# Patient Record
Sex: FEMALE | Race: WHITE | NOT HISPANIC OR LATINO | Employment: OTHER | URBAN - METROPOLITAN AREA
[De-identification: names, ages, dates, MRNs, and addresses within clinical notes are randomized per-mention and may not be internally consistent; named-entity substitution may affect disease eponyms.]

---

## 2017-03-14 ENCOUNTER — NURSE TRIAGE (OUTPATIENT)
Dept: ADMINISTRATIVE | Facility: CLINIC | Age: 77
End: 2017-03-14

## 2017-03-14 NOTE — TELEPHONE ENCOUNTER
Pt reports right hip pain after a fall on Friday night. Pt able to walk without difficutly. Pt denies severe pain. Pt states that motrin totally relieves her pain. Scheduled to see her MD Friday    Reason for Disposition   Injury and pain has not improved after 3 days    Protocols used: ST HIP INJURY-A-[place]

## 2017-03-15 DIAGNOSIS — M25.551 RIGHT HIP PAIN: Primary | ICD-10-CM

## 2017-03-17 ENCOUNTER — OFFICE VISIT (OUTPATIENT)
Dept: ORTHOPEDICS | Facility: CLINIC | Age: 77
End: 2017-03-17
Payer: MEDICARE

## 2017-03-17 ENCOUNTER — HOSPITAL ENCOUNTER (OUTPATIENT)
Dept: RADIOLOGY | Facility: HOSPITAL | Age: 77
Discharge: HOME | End: 2017-03-17
Attending: ORTHOPAEDIC SURGERY
Payer: MEDICARE

## 2017-03-17 VITALS — BODY MASS INDEX: 31.65 KG/M2 | WEIGHT: 172 LBS | HEIGHT: 62 IN

## 2017-03-17 DIAGNOSIS — M25.551 RIGHT HIP PAIN: Primary | ICD-10-CM

## 2017-03-17 DIAGNOSIS — F41.9 ANXIETY: ICD-10-CM

## 2017-03-17 DIAGNOSIS — M25.551 RIGHT HIP PAIN: ICD-10-CM

## 2017-03-17 PROCEDURE — 73502 X-RAY EXAM HIP UNI 2-3 VIEWS: CPT | Mod: 26,RT,, | Performed by: RADIOLOGY

## 2017-03-17 PROCEDURE — 1157F ADVNC CARE PLAN IN RCRD: CPT | Mod: S$GLB,,, | Performed by: ORTHOPAEDIC SURGERY

## 2017-03-17 PROCEDURE — 99213 OFFICE O/P EST LOW 20 MIN: CPT | Mod: S$GLB,,, | Performed by: ORTHOPAEDIC SURGERY

## 2017-03-17 PROCEDURE — 1160F RVW MEDS BY RX/DR IN RCRD: CPT | Mod: S$GLB,,, | Performed by: ORTHOPAEDIC SURGERY

## 2017-03-17 PROCEDURE — 1125F AMNT PAIN NOTED PAIN PRSNT: CPT | Mod: S$GLB,,, | Performed by: ORTHOPAEDIC SURGERY

## 2017-03-17 PROCEDURE — 1159F MED LIST DOCD IN RCRD: CPT | Mod: S$GLB,,, | Performed by: ORTHOPAEDIC SURGERY

## 2017-03-17 PROCEDURE — 99999 PR PBB SHADOW E&M-EST. PATIENT-LVL II: CPT | Mod: PBBFAC,,, | Performed by: ORTHOPAEDIC SURGERY

## 2017-03-20 ENCOUNTER — TELEPHONE (OUTPATIENT)
Dept: ORTHOPEDICS | Facility: CLINIC | Age: 77
End: 2017-03-20

## 2017-03-20 NOTE — TELEPHONE ENCOUNTER
----- Message from Genoveva Gleason sent at 3/20/2017 11:13 AM CDT -----  Contact: juliette   In pain, changed her mind, needs pain medication   .  [facility] [address] [place] [address] AT SEC of [facility] & [address] [address]  Phone: [phone] Fax: [phone]       Call back

## 2017-03-22 ENCOUNTER — TELEPHONE (OUTPATIENT)
Dept: ORTHOPEDICS | Facility: CLINIC | Age: 77
End: 2017-03-22

## 2017-03-22 NOTE — TELEPHONE ENCOUNTER
----- Message from Cyndie Grover sent at 3/22/2017 10:49 AM CDT -----  Contact: Self //   Calling for something stronger for the pain.  [facility] in [place].  Please call

## 2017-04-19 DIAGNOSIS — M25.512 BILATERAL SHOULDER PAIN, UNSPECIFIED CHRONICITY: Primary | ICD-10-CM

## 2017-04-19 DIAGNOSIS — M25.511 BILATERAL SHOULDER PAIN, UNSPECIFIED CHRONICITY: Primary | ICD-10-CM

## 2017-04-20 ENCOUNTER — HOSPITAL ENCOUNTER (OUTPATIENT)
Dept: RADIOLOGY | Facility: HOSPITAL | Age: 77
Discharge: HOME | End: 2017-04-20
Attending: ORTHOPAEDIC SURGERY
Payer: MEDICARE

## 2017-04-20 ENCOUNTER — OFFICE VISIT (OUTPATIENT)
Dept: ORTHOPEDICS | Facility: CLINIC | Age: 77
End: 2017-04-20
Payer: MEDICARE

## 2017-04-20 VITALS — HEIGHT: 62 IN | BODY MASS INDEX: 31.65 KG/M2 | WEIGHT: 172 LBS

## 2017-04-20 DIAGNOSIS — M25.512 BILATERAL SHOULDER PAIN, UNSPECIFIED CHRONICITY: ICD-10-CM

## 2017-04-20 DIAGNOSIS — M25.511 BILATERAL SHOULDER PAIN, UNSPECIFIED CHRONICITY: ICD-10-CM

## 2017-04-20 DIAGNOSIS — M25.551 RIGHT HIP PAIN: Primary | ICD-10-CM

## 2017-04-20 PROCEDURE — 1160F RVW MEDS BY RX/DR IN RCRD: CPT | Mod: S$GLB,,, | Performed by: ORTHOPAEDIC SURGERY

## 2017-04-20 PROCEDURE — 1159F MED LIST DOCD IN RCRD: CPT | Mod: S$GLB,,, | Performed by: ORTHOPAEDIC SURGERY

## 2017-04-20 PROCEDURE — 99213 OFFICE O/P EST LOW 20 MIN: CPT | Mod: S$GLB,,, | Performed by: ORTHOPAEDIC SURGERY

## 2017-04-20 PROCEDURE — 99999 PR PBB SHADOW E&M-EST. PATIENT-LVL II: CPT | Mod: PBBFAC,,, | Performed by: ORTHOPAEDIC SURGERY

## 2017-04-20 PROCEDURE — 73030 X-RAY EXAM OF SHOULDER: CPT | Mod: 26,RT,, | Performed by: RADIOLOGY

## 2017-04-20 NOTE — PROGRESS NOTES
Karolyn Avila, 76 years old, here to have her shoulder and hip checked.  Again,   she had a fall over a month ago.    PHYSICAL EXAM:  Exam today shows she is able to put her arm up over her head   without much difficulty.  No instability.  Cuff strength is weak and painful.    Skin is intact.  Compartments are soft.    X-rays of the shoulder showed degenerative type changes.    ASSESSMENT:  Degenerative disease of the shoulder.    PLAN:  Symptomatic care, reassurance, strengthening exercises.  Follow up as   needed.          REAL/GUERA  dd: 04/20/2017 13:36:04 (CDT)  td: 04/20/2017 15:57:15 (CDT)  Doc ID   #[id number]  Job ID #[id number]    CC:

## 2018-05-08 ENCOUNTER — OFFICE VISIT (OUTPATIENT)
Dept: OTOLARYNGOLOGY | Facility: CLINIC | Age: 78
End: 2018-05-08
Payer: MEDICARE

## 2018-05-08 VITALS — HEIGHT: 62 IN | WEIGHT: 164.63 LBS | BODY MASS INDEX: 30.29 KG/M2 | TEMPERATURE: 99 F

## 2018-05-08 DIAGNOSIS — Z79.899 POLYPHARMACY: ICD-10-CM

## 2018-05-08 DIAGNOSIS — R42 DIZZINESS AND GIDDINESS: ICD-10-CM

## 2018-05-08 DIAGNOSIS — J31.0 CHRONIC RHINITIS: ICD-10-CM

## 2018-05-08 PROCEDURE — 99204 OFFICE O/P NEW MOD 45 MIN: CPT | Mod: S$GLB,,, | Performed by: OTOLARYNGOLOGY

## 2018-05-08 PROCEDURE — 99999 PR PBB SHADOW E&M-EST. PATIENT-LVL III: CPT | Mod: PBBFAC,,, | Performed by: OTOLARYNGOLOGY

## 2018-05-08 NOTE — PROGRESS NOTES
Subjective:       Patient ID: Karolyn Avila is a 77 y.o. female.    Chief Complaint: Sinus Problem; Dizziness; and Ear Fullness    Karolyn is here for nasal congestion and dizziness. Congestion = Symptoms have been present for many years, worse in the am and at night. She uses Flonase 1 spray daily, not every day - this helps.. She takes an oral AH every day. She denies previous sinus surgery.     She also has chronic dizziness for 60 years. She had vertigo when she rolls to the right side which is brief, as well as vertigo when she leans over. This has been going on for a long time and she has modified her positioning to avoid the vertigo. She has been treated for BPPV many years ago. She had severe vertigo many years ago which sounds like a like a vestibular neuritis / labyrinthitis. Family history of Meniere's.   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 She has taken Meclizine and BZD chronically for many years. She has been falling more recently and she uses a walker.      She reports significant mouth dryness. She is on many medications.      History   Smoking Status    Current Every Day Smoker    Packs/day: 0.25   Smokeless Tobacco    Never Used     Comment: 4 cigarettes daily     History   Alcohol Use No          Review of Systems   Constitutional: Negative for activity change and appetite change.   Eyes: Negative for discharge.   Respiratory: Negative for difficulty breathing and wheezing   Cardiovascular: Negative for chest pain.   Gastrointestinal: Negative for abdominal distention and abdominal pain.   Endocrine: Negative for cold intolerance and heat intolerance.   Genitourinary: Negative for dysuria.   Musculoskeletal: Negative for gait problem and joint swelling.   Skin: Negative for color change and pallor.   Neurological: Negative for syncope and weakness.   Psychiatric/Behavioral: Negative for agitation and confusion.     Objective:        Constitutional:   She is oriented to  person, place, and time. She appears well-developed and well-nourished. She appears alert. She is active. Normal speech.      Head:  Normocephalic and atraumatic. Head is without TMJ tenderness. No scars. Salivary glands normal.  Facial strength is normal.      Ears:    Right Ear: No drainage or swelling. No middle ear effusion.   Left Ear: No drainage or swelling.  No middle ear effusion.   Refuses Iza Hallpike    Nose:  No mucosal edema, rhinorrhea, sinus tenderness or septal deviation. No turbinate hypertrophy.      Mouth/Throat  Normal uvula midline and mirror exam normal. She has dentures. Xerostomia (moderate/severe) present. No uvula swelling, lacerations or trismus. No oropharyngeal exudate. Tonsillar erythema, tonsillar exudate.      Neck:  Full range of motion with neck supple and no adenopathy. Thyroid tenderness is present. No tracheal deviation, no edema, no erythema, normal range of motion, no stridor, no crepitus and no neck rigidity present. No thyroid mass present.     Cardiovascular:   Intact distal pulses and normal pulses.      Pulmonary/Chest:   Effort normal and breath sounds normal. No stridor.     Psychiatric:   Her speech is normal and behavior is normal. Her mood appears not anxious. Her affect is not labile.     Neurological:   She is alert and oriented to person, place, and time. No sensory deficit.     Skin:   No abrasions, lacerations, lesions, or rashes. No abrasion and no bruising noted.         Tests / Results:  None    Assessment:       1. Dizziness and giddiness    2. Polypharmacy    3. Chronic rhinitis          Plan:       Regarding Congestion: recommend consistent use of Flonase and increase to 2 spray EN daily. Decrease oral anti-histamine use and see if this can improve dryness in mouth. Also recommend weaning Meclizine and BZD, although she has been on it so long, it may be difficulty.     Discussed that the above medications put her at increased risk for falling. Her  daughters will try to work on decreasing her meds as able. They are aware of w/d side effects with BZD    Suspect BPPV, she doesn't want to test for it. She is very worried about stimulating her vertigo. I told her that I could fix her if this was the case, but she will think about it.    Follow-up 3 months

## 2018-05-08 NOTE — PATIENT INSTRUCTIONS
Go up to 1 spray each nostril twice daily or 2 sprays each nostril once daily - for Flonase    Use Claritin as needed for allergy symptoms (itchy eyes, watery eyes) - this can dry you out    Klonopin and Meclizine are fall risks    Call me back if you'd like to be tested for the inner ear crystals, BPPV

## 2018-05-09 PROBLEM — J31.0 CHRONIC RHINITIS: Status: ACTIVE | Noted: 2018-05-09

## 2018-05-09 PROBLEM — Z79.899 POLYPHARMACY: Status: ACTIVE | Noted: 2018-05-09

## 2018-05-09 PROBLEM — R42 DIZZINESS AND GIDDINESS: Status: ACTIVE | Noted: 2018-05-09

## 2018-10-09 DIAGNOSIS — R29.898 WEAKNESS OF BOTH LEGS: Primary | ICD-10-CM

## 2018-10-09 DIAGNOSIS — M17.0 PRIMARY OSTEOARTHRITIS OF BOTH KNEES: ICD-10-CM

## 2019-01-03 DIAGNOSIS — M25.512 LEFT SHOULDER PAIN, UNSPECIFIED CHRONICITY: Primary | ICD-10-CM

## 2019-01-03 DIAGNOSIS — M25.512 ACUTE PAIN OF LEFT SHOULDER: ICD-10-CM

## 2019-01-07 ENCOUNTER — HOSPITAL ENCOUNTER (OUTPATIENT)
Dept: RADIOLOGY | Facility: HOSPITAL | Age: 79
Discharge: HOME | End: 2019-01-07
Attending: ORTHOPAEDIC SURGERY
Payer: MEDICARE

## 2019-01-07 ENCOUNTER — OFFICE VISIT (OUTPATIENT)
Dept: ORTHOPEDICS | Facility: CLINIC | Age: 79
End: 2019-01-07
Payer: MEDICARE

## 2019-01-07 VITALS — HEIGHT: 60 IN | WEIGHT: 166 LBS | BODY MASS INDEX: 32.59 KG/M2

## 2019-01-07 DIAGNOSIS — M25.512 ACUTE PAIN OF LEFT SHOULDER: ICD-10-CM

## 2019-01-07 DIAGNOSIS — M25.512 LEFT SHOULDER PAIN, UNSPECIFIED CHRONICITY: Primary | ICD-10-CM

## 2019-01-07 DIAGNOSIS — M19.012 OSTEOARTHRITIS OF LEFT SHOULDER, UNSPECIFIED OSTEOARTHRITIS TYPE: ICD-10-CM

## 2019-01-07 DIAGNOSIS — S49.92XA INJURY OF LEFT SHOULDER, INITIAL ENCOUNTER: ICD-10-CM

## 2019-01-07 PROCEDURE — 20610 LARGE JOINT ASPIRATION/INJECTION: L SUBACROMIAL BURSA: ICD-10-PCS | Mod: LT,S$GLB,, | Performed by: ORTHOPAEDIC SURGERY

## 2019-01-07 PROCEDURE — 1101F PT FALLS ASSESS-DOCD LE1/YR: CPT | Mod: CPTII,S$GLB,, | Performed by: ORTHOPAEDIC SURGERY

## 2019-01-07 PROCEDURE — 73030 X-RAY EXAM OF SHOULDER: CPT | Mod: TC,PO,LT

## 2019-01-07 PROCEDURE — 99999 PR PBB SHADOW E&M-EST. PATIENT-LVL II: CPT | Mod: PBBFAC,,, | Performed by: ORTHOPAEDIC SURGERY

## 2019-01-07 PROCEDURE — 20610 DRAIN/INJ JOINT/BURSA W/O US: CPT | Mod: LT,S$GLB,, | Performed by: ORTHOPAEDIC SURGERY

## 2019-01-07 PROCEDURE — 99999 PR PBB SHADOW E&M-EST. PATIENT-LVL II: ICD-10-PCS | Mod: PBBFAC,,, | Performed by: ORTHOPAEDIC SURGERY

## 2019-01-07 PROCEDURE — 1101F PR PT FALLS ASSESS DOC 0-1 FALLS W/OUT INJ PAST YR: ICD-10-PCS | Mod: CPTII,S$GLB,, | Performed by: ORTHOPAEDIC SURGERY

## 2019-01-07 PROCEDURE — 73030 X-RAY EXAM OF SHOULDER: CPT | Mod: 26,LT,, | Performed by: RADIOLOGY

## 2019-01-07 PROCEDURE — 73030 XR SHOULDER TRAUMA 3 VIEW LEFT: ICD-10-PCS | Mod: 26,LT,, | Performed by: RADIOLOGY

## 2019-01-07 PROCEDURE — 99214 OFFICE O/P EST MOD 30 MIN: CPT | Mod: 25,S$GLB,, | Performed by: ORTHOPAEDIC SURGERY

## 2019-01-07 PROCEDURE — 99214 PR OFFICE/OUTPT VISIT, EST, LEVL IV, 30-39 MIN: ICD-10-PCS | Mod: 25,S$GLB,, | Performed by: ORTHOPAEDIC SURGERY

## 2019-01-07 RX ORDER — TRIAMCINOLONE ACETONIDE 40 MG/ML
80 INJECTION, SUSPENSION INTRA-ARTICULAR; INTRAMUSCULAR
Status: DISCONTINUED | OUTPATIENT
Start: 2019-01-07 | End: 2019-01-07 | Stop reason: HOSPADM

## 2019-01-07 RX ADMIN — TRIAMCINOLONE ACETONIDE 80 MG: 40 INJECTION, SUSPENSION INTRA-ARTICULAR; INTRAMUSCULAR at 02:01

## 2019-01-07 NOTE — PROCEDURES
Large Joint Aspiration/Injection: L subacromial bursa  Date/Time: 1/7/2019 2:18 PM  Performed by: Carlos Burgos MD  Authorized by: Carlos Burgos MD     Consent Done?:  Yes (Verbal)  Indications:  Pain  Procedure site marked: Yes    Timeout: Prior to procedure the correct patient, procedure, and site was verified      Location:  Shoulder  Site:  L subacromial bursa  Prep: Patient was prepped and draped in usual sterile fashion    Ultrasonic Guidance for needle placement: No  Needle size:  21 G  Approach:  Posterior  Medications:  80 mg triamcinolone acetonide 40 mg/mL  Patient tolerance:  Patient tolerated the procedure well with no immediate complications

## 2019-01-08 ENCOUNTER — TELEPHONE (OUTPATIENT)
Dept: ORTHOPEDICS | Facility: CLINIC | Age: 79
End: 2019-01-08

## 2019-01-08 NOTE — TELEPHONE ENCOUNTER
Patients daughter called and stated that home health is approved. Home health is notified. ----- Message from Sammie Garay sent at 1/8/2019  1:17 PM CST -----  Contact: pt daughter  Pt called to speak with nurse.            Pt callback number [phone]

## 2019-01-08 NOTE — TELEPHONE ENCOUNTER
Talked with daughter and she is going to call the insurance company and get her set up for home health so patient can be seen quicker. Daughter stated that patient is currently not under home health care. ----- Message from Alicia Oconnor sent at 1/8/2019  8:23 AM CST -----  Contact: Carla HAMPTON  Carla states she received the referral for the patients home health and ran the insurance and it is showing pending.  No one will be going out until she can get the insurance approved and she just wanted to let you know.  As soon as she gets it fixed she will let you know.  Call back if any questions at  [phone].  Thanks

## 2019-05-01 ENCOUNTER — TELEPHONE (OUTPATIENT)
Dept: AUDIOLOGY | Facility: CLINIC | Age: 79
End: 2019-05-01

## 2019-05-01 NOTE — TELEPHONE ENCOUNTER
Called [facility] back to let them know that pt was seen by Dr. Davide Agosto on 5/8/18 for dizziness and declined testing at that time due to concerns that it would trigger her vertigo.  Explained that the same testing is needed to see if she has BPPV as this is what was suspected last time and that it would likely trigger some vertigo.  They will check with patient to see if she is willing to undergo testing and call back to schedule if so.

## 2019-05-01 NOTE — TELEPHONE ENCOUNTER
----- Message from Jailene Borges sent at 4/25/2019 11:19 AM CDT -----  Patient is dizzy and wants to schedule. Patient is at [facility]. Number for [facility]: [phone].

## 2019-05-22 NOTE — TELEPHONE ENCOUNTER
Appt was scheduled by [facility] and later cancelled when pt said that she did not want to come for testing.

## 2021-05-10 ENCOUNTER — PATIENT MESSAGE (OUTPATIENT)
Dept: RESEARCH | Facility: HOSPITAL | Age: 81
End: 2021-05-10

## 2023-05-09 ENCOUNTER — HOSPITAL ENCOUNTER (OUTPATIENT)
Dept: RADIOLOGY | Facility: HOSPITAL | Age: 83
Discharge: HOME | End: 2023-05-09
Attending: PHYSICIAN ASSISTANT
Payer: MEDICARE

## 2023-05-09 ENCOUNTER — OFFICE VISIT (OUTPATIENT)
Dept: ORTHOPEDICS | Facility: CLINIC | Age: 83
End: 2023-05-09
Payer: MEDICARE

## 2023-05-09 DIAGNOSIS — M79.642 LEFT HAND PAIN: ICD-10-CM

## 2023-05-09 DIAGNOSIS — M79.642 LEFT HAND PAIN: Primary | ICD-10-CM

## 2023-05-09 DIAGNOSIS — S69.92XA INJURY OF LEFT HAND, INITIAL ENCOUNTER: Primary | ICD-10-CM

## 2023-05-09 PROCEDURE — 1159F MED LIST DOCD IN RCRD: CPT | Mod: CPTII,S$GLB,, | Performed by: PHYSICIAN ASSISTANT

## 2023-05-09 PROCEDURE — 3288F FALL RISK ASSESSMENT DOCD: CPT | Mod: CPTII,S$GLB,, | Performed by: PHYSICIAN ASSISTANT

## 2023-05-09 PROCEDURE — 1125F AMNT PAIN NOTED PAIN PRSNT: CPT | Mod: CPTII,S$GLB,, | Performed by: PHYSICIAN ASSISTANT

## 2023-05-09 PROCEDURE — 73130 X-RAY EXAM OF HAND: CPT | Mod: 26,LT,, | Performed by: RADIOLOGY

## 2023-05-09 PROCEDURE — 1159F PR MEDICATION LIST DOCUMENTED IN MEDICAL RECORD: ICD-10-PCS | Mod: CPTII,S$GLB,, | Performed by: PHYSICIAN ASSISTANT

## 2023-05-09 PROCEDURE — 99203 OFFICE O/P NEW LOW 30 MIN: CPT | Mod: S$GLB,,, | Performed by: PHYSICIAN ASSISTANT

## 2023-05-09 PROCEDURE — 73130 X-RAY EXAM OF HAND: CPT | Mod: TC,PO,LT

## 2023-05-09 PROCEDURE — 1100F PTFALLS ASSESS-DOCD GE2>/YR: CPT | Mod: CPTII,S$GLB,, | Performed by: PHYSICIAN ASSISTANT

## 2023-05-09 PROCEDURE — 1160F RVW MEDS BY RX/DR IN RCRD: CPT | Mod: CPTII,S$GLB,, | Performed by: PHYSICIAN ASSISTANT

## 2023-05-09 PROCEDURE — 99999 PR PBB SHADOW E&M-EST. PATIENT-LVL III: ICD-10-PCS | Mod: PBBFAC,,, | Performed by: PHYSICIAN ASSISTANT

## 2023-05-09 PROCEDURE — 1160F PR REVIEW ALL MEDS BY PRESCRIBER/CLIN PHARMACIST DOCUMENTED: ICD-10-PCS | Mod: CPTII,S$GLB,, | Performed by: PHYSICIAN ASSISTANT

## 2023-05-09 PROCEDURE — 99203 PR OFFICE/OUTPT VISIT, NEW, LEVL III, 30-44 MIN: ICD-10-PCS | Mod: S$GLB,,, | Performed by: PHYSICIAN ASSISTANT

## 2023-05-09 PROCEDURE — 3288F PR FALLS RISK ASSESSMENT DOCUMENTED: ICD-10-PCS | Mod: CPTII,S$GLB,, | Performed by: PHYSICIAN ASSISTANT

## 2023-05-09 PROCEDURE — 99999 PR PBB SHADOW E&M-EST. PATIENT-LVL III: CPT | Mod: PBBFAC,,, | Performed by: PHYSICIAN ASSISTANT

## 2023-05-09 PROCEDURE — 1125F PR PAIN SEVERITY QUANTIFIED, PAIN PRESENT: ICD-10-PCS | Mod: CPTII,S$GLB,, | Performed by: PHYSICIAN ASSISTANT

## 2023-05-09 PROCEDURE — 73130 XR HAND COMPLETE 3 VIEW LEFT: ICD-10-PCS | Mod: 26,LT,, | Performed by: RADIOLOGY

## 2023-05-09 PROCEDURE — 1100F PR PT FALLS ASSESS DOC 2+ FALLS/FALL W/INJURY/YR: ICD-10-PCS | Mod: CPTII,S$GLB,, | Performed by: PHYSICIAN ASSISTANT

## 2023-05-09 NOTE — PROGRESS NOTES
5/9/2023    Chief Complaint:  Chief Complaint   Patient presents with    Left Hand - Injury, Pain     Thumb - Fall - 1 month       HPI:  Karolyn Avila is a 82 y.o. female, who presents to clinic today with her daughter for evaluation of her left hand/thumb injury.  States approximally 1.5 months ago she fell trying to get into her bed and brace herself with her left hand/thumb.  States since that time she is noticed some pain to the left hand/thumb.  States pain is worse with activity.  States pain is better with rest.  States pain on average is 5/10.  Denies any other complaints at this time.    PMHX:  Past Medical History:   Diagnosis Date    Anxiety     Depression     Female stress incontinence     GERD (gastroesophageal reflux disease)     Hypertension     Irritable bowel syndrome     Smoker        PSHX:  Past Surgical History:   Procedure Laterality Date    APPENDECTOMY      HYSTERECTOMY  1974    fibroids    JOINT REPLACEMENT      TONSILLECTOMY      TOTAL KNEE ARTHROPLASTY      right 04/23/2008, left 04/15/2013       FMHX:  Family History   Problem Relation Age of Onset    Diabetes Father     Diabetes Sister     Diabetes Maternal Uncle     Alzheimer's disease Mother     Hypertension Mother        SOCHX:  Social History     Tobacco Use    Smoking status: Every Day     Packs/day: 0.25     Types: Cigarettes    Smokeless tobacco: Never    Tobacco comments:     4 cigarettes daily   Substance Use Topics    Alcohol use: No       ALLERGIES:  Carisoprodol, Haloperidol decanoate, and Morphine    CURRENT MEDICATIONS:  Current Outpatient Medications on File Prior to Visit   Medication Sig Dispense Refill    aspirin (ECOTRIN) 81 MG EC tablet Take 81 mg by mouth once daily.      clonazePAM (KLONOPIN) 0.5 MG tablet TAKE 1 TABLET BY MOUTH TWICE DAILY 60 tablet 0    DULoxetine (CYMBALTA) 60 MG capsule TAKE 2 CAPSULES BY MOUTH DAILY 180 capsule 0    esomeprazole magnesium 20 mg TbEC Take 1 tablet by mouth daily as needed.       ibuprofen (ADVIL,MOTRIN) 200 MG tablet Take 200 mg by mouth daily as needed for Pain.       lisinopril (PRINIVIL,ZESTRIL) 20 MG tablet TAKE 1 TABLET BY MOUTH TWICE DAILY 180 tablet 3    meclizine (ANTIVERT) 25 mg tablet Take 25 mg by mouth 3 (three) times daily as needed.      meclizine (ANTIVERT) 25 mg tablet TAKE ONE TABLET BY MOUTH THREE TIMES DAILY AS NEEDED FOR DIZZINESS OR NAUSEA 30 tablet 0    metoprolol tartrate (LOPRESSOR) 100 MG tablet TAKE 1 TABLET BY MOUTH TWICE DAILY 180 tablet 0    multivitamin capsule Take 1 capsule by mouth once daily.      amLODIPine (NORVASC) 5 MG tablet Take 1 tablet (5 mg total) by mouth once daily. 30 tablet 11     No current facility-administered medications on file prior to visit.       REVIEW OF SYSTEMS:  Review of Systems   Constitutional: Negative.    HENT:  Positive for hearing loss.    Eyes: Negative.    Respiratory: Negative.     Cardiovascular: Negative.    Gastrointestinal: Negative.    Genitourinary: Negative.    Musculoskeletal:  Positive for falls and joint pain.   Skin: Negative.    Neurological:  Positive for weakness.   Endo/Heme/Allergies:  Bruises/bleeds easily.   Psychiatric/Behavioral: Negative.       GENERAL PHYSICAL EXAM:   There were no vitals taken for this visit.   GEN: well developed, well nourished, no acute distress   HENT: Normocephalic, atraumatic   EYES: No discharge, conjunctiva normal   NECK: Supple, non-tender   PULM: No wheezing, no respiratory distress   CV: RRR   ABD: Soft, non-tender    ORTHO EXAM:   Examination of the left hand/wrist reveals no edema, erythema, ecchymosis, or skin breakdown.  Able make composite fist and fully extend all fingers.  Full intact range of motion left wrist.  Tenderness to palpation of the 1st extensor compartment.  No significant tenderness with palpation throughout the remainder of the left hand/wrist.  Mildly positive Finkelstein's test.  Normal sensation in the radial, ulnar, median nerve distributions.   Capillary refill less than 2 seconds in all fingers.    RADIOLOGY:   X-rays of the left hand were taken today in clinic.  X-rays reviewed by myself.  Imaging showed no acute fracture or dislocation.  No subluxation.  Presence of scattered degenerative changes throughout the joints of the right hand.  Presence of mild degenerative changes of the radiocarpal joint.  Presence of calcifications over the dorsum of the radiocarpal joint.  No osseous destructive/erosive processes noted.  No radiopaque foreign body or mass noted.  No other significant bony abnormalities noted.    ASSESSMENT:   Left wrist injury with questionable associated de Quervain tenosynovitis    PLAN:  1. I discussed with Karolyn Avila and her daughter the left hand/wrist injury pathology and treatment options in detail during today's visit.  After treatment options were discussed, decided the best course of action this time is to proceed with immobilization via a removable Velcro thumb spica splint.  We discussed importance of wearing the splint at all times only to remove for bathing and gentle range-of-motion exercises demonstrated in clinic.  They verbally agreed with the treatment plan.      2.  She was placed in a removable Velcro thumb spica splint of the left thumb in clinic today.      3. I would like her follow up in clinic in 3 weeks for repeat evaluation.  She was instructed to contact the clinic for any problems or concerns in the interim.

## 2023-05-30 ENCOUNTER — OFFICE VISIT (OUTPATIENT)
Dept: ORTHOPEDICS | Facility: CLINIC | Age: 83
End: 2023-05-30
Payer: MEDICARE

## 2023-05-30 VITALS — HEIGHT: 60 IN | WEIGHT: 158 LBS | BODY MASS INDEX: 31.02 KG/M2

## 2023-05-30 DIAGNOSIS — M65.4 DE QUERVAIN'S TENOSYNOVITIS, LEFT: Primary | ICD-10-CM

## 2023-05-30 PROCEDURE — 99999 PR PBB SHADOW E&M-EST. PATIENT-LVL III: ICD-10-PCS | Mod: PBBFAC,,, | Performed by: PHYSICIAN ASSISTANT

## 2023-05-30 PROCEDURE — 99213 PR OFFICE/OUTPT VISIT, EST, LEVL III, 20-29 MIN: ICD-10-PCS | Mod: 25,S$GLB,, | Performed by: PHYSICIAN ASSISTANT

## 2023-05-30 PROCEDURE — 99999 PR PBB SHADOW E&M-EST. PATIENT-LVL III: CPT | Mod: PBBFAC,,, | Performed by: PHYSICIAN ASSISTANT

## 2023-05-30 PROCEDURE — 1101F PR PT FALLS ASSESS DOC 0-1 FALLS W/OUT INJ PAST YR: ICD-10-PCS | Mod: CPTII,S$GLB,, | Performed by: PHYSICIAN ASSISTANT

## 2023-05-30 PROCEDURE — 20550 NJX 1 TENDON SHEATH/LIGAMENT: CPT | Mod: LT,S$GLB,, | Performed by: PHYSICIAN ASSISTANT

## 2023-05-30 PROCEDURE — 1101F PT FALLS ASSESS-DOCD LE1/YR: CPT | Mod: CPTII,S$GLB,, | Performed by: PHYSICIAN ASSISTANT

## 2023-05-30 PROCEDURE — 1160F PR REVIEW ALL MEDS BY PRESCRIBER/CLIN PHARMACIST DOCUMENTED: ICD-10-PCS | Mod: CPTII,S$GLB,, | Performed by: PHYSICIAN ASSISTANT

## 2023-05-30 PROCEDURE — 99213 OFFICE O/P EST LOW 20 MIN: CPT | Mod: 25,S$GLB,, | Performed by: PHYSICIAN ASSISTANT

## 2023-05-30 PROCEDURE — 1160F RVW MEDS BY RX/DR IN RCRD: CPT | Mod: CPTII,S$GLB,, | Performed by: PHYSICIAN ASSISTANT

## 2023-05-30 PROCEDURE — 1125F PR PAIN SEVERITY QUANTIFIED, PAIN PRESENT: ICD-10-PCS | Mod: CPTII,S$GLB,, | Performed by: PHYSICIAN ASSISTANT

## 2023-05-30 PROCEDURE — 1159F PR MEDICATION LIST DOCUMENTED IN MEDICAL RECORD: ICD-10-PCS | Mod: CPTII,S$GLB,, | Performed by: PHYSICIAN ASSISTANT

## 2023-05-30 PROCEDURE — 3288F PR FALLS RISK ASSESSMENT DOCUMENTED: ICD-10-PCS | Mod: CPTII,S$GLB,, | Performed by: PHYSICIAN ASSISTANT

## 2023-05-30 PROCEDURE — 1125F AMNT PAIN NOTED PAIN PRSNT: CPT | Mod: CPTII,S$GLB,, | Performed by: PHYSICIAN ASSISTANT

## 2023-05-30 PROCEDURE — 1159F MED LIST DOCD IN RCRD: CPT | Mod: CPTII,S$GLB,, | Performed by: PHYSICIAN ASSISTANT

## 2023-05-30 PROCEDURE — 20550 TENDON SHEATH: ICD-10-PCS | Mod: LT,S$GLB,, | Performed by: PHYSICIAN ASSISTANT

## 2023-05-30 PROCEDURE — 3288F FALL RISK ASSESSMENT DOCD: CPT | Mod: CPTII,S$GLB,, | Performed by: PHYSICIAN ASSISTANT

## 2023-05-30 RX ORDER — TRIAMCINOLONE ACETONIDE 40 MG/ML
20 INJECTION, SUSPENSION INTRA-ARTICULAR; INTRAMUSCULAR
Status: DISCONTINUED | OUTPATIENT
Start: 2023-05-30 | End: 2023-05-30 | Stop reason: HOSPADM

## 2023-05-30 RX ADMIN — TRIAMCINOLONE ACETONIDE 20 MG: 40 INJECTION, SUSPENSION INTRA-ARTICULAR; INTRAMUSCULAR at 02:05

## 2023-05-30 NOTE — PROGRESS NOTES
5/30/2023    HPI:  Karolyn Avila is a 82 y.o. female, who presents to clinic today for continued evaluation of her left thumb/wrist injury and pain.  She is approximately 2 months status post injury.  States no significant improvement in her pain with the splinting.  States no significant change in her symptoms since last visit.  States pain on average is 4/10.  Denies being a diabetic, and states she has tolerated steroid injections in the past with no adverse reactions.  Denies any other complaints at this time.    PMHX:  Past Medical History:   Diagnosis Date    Anxiety     Depression     Female stress incontinence     GERD (gastroesophageal reflux disease)     Hypertension     Irritable bowel syndrome     Smoker        PSHX:  Past Surgical History:   Procedure Laterality Date    APPENDECTOMY      HYSTERECTOMY  1974    fibroids    JOINT REPLACEMENT      TONSILLECTOMY      TOTAL KNEE ARTHROPLASTY      right 04/23/2008, left 04/15/2013       FMHX:  Family History   Problem Relation Age of Onset    Diabetes Father     Diabetes Sister     Diabetes Maternal Uncle     Alzheimer's disease Mother     Hypertension Mother        SOCHX:  Social History     Tobacco Use    Smoking status: Every Day     Packs/day: 0.25     Types: Cigarettes    Smokeless tobacco: Never    Tobacco comments:     4 cigarettes daily   Substance Use Topics    Alcohol use: No       ALLERGIES:  Carisoprodol, Haloperidol decanoate, and Morphine    CURRENT MEDICATIONS:  Current Outpatient Medications on File Prior to Visit   Medication Sig Dispense Refill    aspirin (ECOTRIN) 81 MG EC tablet Take 81 mg by mouth once daily.      clonazePAM (KLONOPIN) 0.5 MG tablet TAKE 1 TABLET BY MOUTH TWICE DAILY 60 tablet 0    DULoxetine (CYMBALTA) 60 MG capsule TAKE 2 CAPSULES BY MOUTH DAILY 180 capsule 0    esomeprazole magnesium 20 mg TbEC Take 1 tablet by mouth daily as needed.      ibuprofen (ADVIL,MOTRIN) 200 MG tablet Take 200 mg by mouth daily as needed for  Pain.       lisinopril (PRINIVIL,ZESTRIL) 20 MG tablet TAKE 1 TABLET BY MOUTH TWICE DAILY 180 tablet 3    meclizine (ANTIVERT) 25 mg tablet Take 25 mg by mouth 3 (three) times daily as needed.      meclizine (ANTIVERT) 25 mg tablet TAKE ONE TABLET BY MOUTH THREE TIMES DAILY AS NEEDED FOR DIZZINESS OR NAUSEA 30 tablet 0    metoprolol tartrate (LOPRESSOR) 100 MG tablet TAKE 1 TABLET BY MOUTH TWICE DAILY 180 tablet 0    multivitamin capsule Take 1 capsule by mouth once daily.      amLODIPine (NORVASC) 5 MG tablet Take 1 tablet (5 mg total) by mouth once daily. 30 tablet 11     No current facility-administered medications on file prior to visit.       REVIEW OF SYSTEMS:  Review of Systems Complete; Negative, unless noted above.    GENERAL PHYSICAL EXAM:   Ht 5' (1.524 m)   Wt 71.7 kg (158 lb)   BMI 30.86 kg/m²    GEN: well developed, well nourished, no acute distress   PULM: No wheezing, no respiratory distress   CV: RRR    ORTHO EXAM:   Examination of left hand/wrist reveals mild edema of the 1st extensor compartment.  No erythema, ecchymosis or skin breakdown.  Able make composite fist and fully extend all fingers.  Full intact range of motion left wrist.  Tenderness palpation of the 1st extensor compartment.  Positive Finkelstein's test.  No tenderness of the remainder of the left hand/wrist.  Normal sensation in the radial, ulnar, median nerve distributions.  Capillary refill less than 2 seconds in all fingers.    RADIOLOGY:   None.    ASSESSMENT:   De Quervain's tenosynovitis of the left wrist    PLAN:  1. I discussed with Karolyn Avila and her daughter to de Quervain tenosynovitis pathology and treatment options in detail during today's visit.  After treatment options were discussed, decided the best course of action this time is perform a steroid injection into the 1st extensor compartment of the left wrist in clinic today.  They verbally agreed with the treatment plan    2.  Informed consent was obtained.   After an alcohol prep followed by chlorhexidine prep, steroid injection was placed into the 1st extensor compartment of the left wrist.  She tolerated the procedure well with no immediate complications.      3.  Orders were written for her nursing home stating to remove the Band-Aid applied in clinic today before bed tonight.  Orders were also written to monitor the injection site for any signs of infection for the next 5 days, and for any signs of infection to report to the nearest emergency department or contact the clinic.    4. I would like her to follow up in clinic on a p.r.n. basis for any worsening of her symptoms or for any hand, wrist, or elbow problems concerns.  She was instructed to contact the clinic for any problems or concerns in the interim.

## 2023-05-30 NOTE — PROCEDURES
Tendon Sheath    Date/Time: 5/30/2023 2:20 PM  Performed by: Weston Nickerson PA-C  Authorized by: Weston Nickerson PA-C     Consent Done?:  Yes (Verbal)  Indications:  Pain  Site marked: the procedure site was marked    Timeout: prior to procedure the correct patient, procedure, and site was verified    Prep: patient was prepped and draped in usual sterile fashion      Local anesthesia used?: Yes    Local anesthetic:  Lidocaine 1% without epinephrine  Anesthetic total (ml):  0.5    Location:  Wrist  Site:  L first doral compartment (First extensor compartment)  Ultrasonic guidance for needle placement?: No    Needle size:  25 G  Approach:  Volar  Medications:  20 mg triamcinolone acetonide 40 mg/mL (20 mg injected)  Patient tolerance:  Patient tolerated the procedure well with no immediate complications

## 2024-08-03 NOTE — PROGRESS NOTES
Karolyn Avila, complaining of pain in her right hip area.  About seven days   ago, she had a fall on that side.  She said she hurt more when she is trying to   get up, maybe strains some muscles.  Want to have it checked out here today with   her daughters.    Exam today shows hip range of motion is painless.  Skin is intact.  Compartments   are soft.  No instability.  Good strength.    X-rays are negative.    ASSESSMENT:  Hip pain times a week.    PLAN:  Symptomatic care, relative rest.  She has been ambulating with a cane. We   will allow her to continue to do so.  We will check her back in a few weeks'   time to ensure she is getting better.      REAL/GUERA  dd: 03/17/2017 09:17:54 (CDT)  td: 03/17/2017 12:35:04 (CDT)  Doc ID   #[id number]  Job ID #[id number]    CC:        patient can be discharged home  needs to followup with dr duval of GI as outpatient.      thank you  dr freda michaesl 21M no sig PMhx pw pancreatitis and nonobstructive intussusception.    #Pancreatitis possibly sec. to ETOH  -pt admits to excessive drinking on Sat night and now with abd pain and N/V for last 4 days  -cont NPO, IVF, PPI, pain mgmt  -CT of a/p noted: acute interstitial pancreatitis, non-obstructive intussecption  -normal LFTs  -Surgery and GI consulted- check CT of A/P with oral contrast  -Lipase:  375>351  -no fevers, wbc trending down    #Intussusception nonobstructive  -Keep NPO, await CT results  -f/u Surgery recs    Dispo:  Mother at bedside and updated. I reviewed repeat ct scan done with oral contrast, we do not se the itussuscepted poitn of small bowel anymore though there is still some expected thikened/edematous jejunum.  at this time does not need to go to OR today.    I would like to maintain NPO status and I will reexamine him early tomorrow morning. Hopefully, he will continue to make some progress (he has some flatus but no bm).    If he does not continue to improve I will do diagnostic laparoscopy then.      I discussed this with patient and his mom Rena and they are in agreement with this plan.    thank you  dr freda michaels  cell# [phone]

## 2025-02-17 PROBLEM — J44.9 CHRONIC OBSTRUCTIVE PULMONARY DISEASE, UNSPECIFIED COPD TYPE: Status: ACTIVE | Noted: 2025-02-17

## 2025-08-01 ENCOUNTER — OFFICE VISIT (OUTPATIENT)
Dept: OPTOMETRY | Facility: CLINIC | Age: 85
End: 2025-08-01
Payer: MEDICARE

## 2025-08-01 DIAGNOSIS — I10 PRIMARY HYPERTENSION: Primary | ICD-10-CM

## 2025-08-01 DIAGNOSIS — H43.813 POSTERIOR VITREOUS DETACHMENT OF BOTH EYES: ICD-10-CM

## 2025-08-01 DIAGNOSIS — H52.03 HYPEROPIA WITH ASTIGMATISM AND PRESBYOPIA, BILATERAL: ICD-10-CM

## 2025-08-01 DIAGNOSIS — Z96.1 PSEUDOPHAKIA OF BOTH EYES: ICD-10-CM

## 2025-08-01 DIAGNOSIS — H52.4 HYPEROPIA WITH ASTIGMATISM AND PRESBYOPIA, BILATERAL: ICD-10-CM

## 2025-08-01 DIAGNOSIS — H52.203 HYPEROPIA WITH ASTIGMATISM AND PRESBYOPIA, BILATERAL: ICD-10-CM

## 2025-08-01 PROCEDURE — 99999 PR PBB SHADOW E&M-EST. PATIENT-LVL III: CPT | Mod: PBBFAC,,,

## 2025-08-01 NOTE — PROGRESS NOTES
HPI    Annual JOSHUA 20+ years (unknown)    Pt recently broke specs, needs updated Rx. Pt denies new floaters, flashes   and eye pain. HTN controlled w aid  Gtts: none  Last edited by Pooja Thornton on 8/1/2025  9:07 AM.            Assessment /Plan     For exam results, see Encounter Report.    Primary hypertension    Posterior vitreous detachment of both eyes    Pseudophakia of both eyes    Hyperopia with astigmatism and presbyopia, bilateral      No hypertensive retinopathy OD, OS. Continue good blood pressure control. Monitor yearly for changes, sooner if any changes in vision or other issues arise.  No holes, tears, or retinal detachments 360. Ed pt on the nature and etiology of posterior vitreous detachments. Reviewed signs and symptoms of a retinal detachment thoroughly and ed pt to RTC asap if experienced.  PCIOL with mild posterior capsular opacification OU. Monitor yearly for changes.  Discussed spectacle options with pt and released final spec rx, SVN. Ed pt on change in rx and adaptation.    RTC: 1 year for comprehensive exam or sooner prn